# Patient Record
(demographics unavailable — no encounter records)

---

## 2024-11-20 NOTE — DISCUSSION/SUMMARY
[de-identified] : Chief complaint: Right elbow pain  HPI: Patient is a 4-year-old right-hand-dominant female who presents to the office today accompanied by her mother for the evaluation of pain of the right elbow which manifested on Monday, 11/18/2024.  Patient's mother reports that the patient was playing in the playground when she tripped and fell over another student's foot landing on the right elbow.  Patient sustained an abrasion to the area.  Since that time the patient has not been straightening the elbow completely.  She has also been complaining of pain to the right elbow and has had difficulty putting on clothes.  Patient's mother denies any previous injury or surgery to the patient's right elbow.  ROS: Positive for right elbow pain  Physical examination of the right elbow:  Mild edema noted to the dorsal, lateral aspect of the elbow Abrasion noted to the dorsal, lateral aspect of the elbow which appears to be healing well without any active signs of infection No appreciable ecchymosis Full range of motion to the right elbow in flexion actively Limited active range of motion in full extension Limited passive range of motion in extension secondary to patient resistance and guarding  Patient is able to supinate and pronate at the elbow No appreciable tenderness over the radial head, lateral epicondyle, medial epicondyle, olecranon process Tenderness to palpation over the dorsal, lateral aspect of the elbow medial to the lateral epicondyle Full range of motion to the right wrist and hand without pain Full range of motion of the right shoulder without pain  Three-view x-rays of the bilateral elbows performed in the office today for comparison purposes show no obvious acute displaced fracture, subluxation, or dislocation  Assessment/plan: Right elbow injury, given that the patient has limited active range of motion in full extension of the elbow today the patient was placed in a right upper extremity long-arm splint, she was provided with a right upper extremity sling for comfort, I recommend wearing the splint at all times until her next follow-up, splint care was discussed with the patient's mother, recommend purchase of a cast guard/cast cover for use with showering/bathing, patient can be given over-the-counter children's Tylenol or Motrin for pain, patient will abstain from all gym and sports at this time, she will be provided with a follow-up with Dr. Mensah for repeat evaluation next Tuesday, 11/26/2024, patient's mother verbalized understanding of all findings in the office today, agree to follow-up as directed

## 2024-12-02 NOTE — PHYSICAL EXAM
[Not Examined] : not examined [Normal] : The patient is moving all extremities spontaneously without any gross neurologic deficits. They walk with a fluid nonantalgic gait. There are equal and symmetric deep tendon reflexes in the upper and lower extremities bilaterally. There is gross intact sensation to soft and light touch in the bilateral upper and lower extremities [de-identified] :  ISLT Intact Motor

## 2024-12-02 NOTE — PHYSICAL EXAM
[Not Examined] : not examined [Normal] : The patient is moving all extremities spontaneously without any gross neurologic deficits. They walk with a fluid nonantalgic gait. There are equal and symmetric deep tendon reflexes in the upper and lower extremities bilaterally. There is gross intact sensation to soft and light touch in the bilateral upper and lower extremities [de-identified] :  ISLT Intact Motor